# Patient Record
Sex: FEMALE | Race: WHITE
[De-identification: names, ages, dates, MRNs, and addresses within clinical notes are randomized per-mention and may not be internally consistent; named-entity substitution may affect disease eponyms.]

---

## 2022-03-18 PROBLEM — E78.00 ELEVATED LDL CHOLESTEROL LEVEL: Status: ACTIVE | Noted: 2021-10-18

## 2022-03-18 PROBLEM — F41.1 GENERALIZED ANXIETY DISORDER: Status: ACTIVE | Noted: 2021-10-18

## 2022-03-19 PROBLEM — Z13.39 ADHD (ATTENTION DEFICIT HYPERACTIVITY DISORDER) EVALUATION: Status: ACTIVE | Noted: 2021-10-18

## 2022-03-20 PROBLEM — F90.0 ADHD, PREDOMINANTLY INATTENTIVE TYPE: Status: ACTIVE | Noted: 2022-02-14

## 2022-07-11 DIAGNOSIS — F90.0 ADHD, PREDOMINANTLY INATTENTIVE TYPE: Primary | ICD-10-CM

## 2022-07-11 RX ORDER — DEXMETHYLPHENIDATE HYDROCHLORIDE 15 MG/1
15 CAPSULE, EXTENDED RELEASE ORAL DAILY
Qty: 30 CAPSULE | Refills: 0 | Status: SHIPPED | OUTPATIENT
Start: 2022-07-11 | End: 2022-08-10

## 2022-07-11 NOTE — TELEPHONE ENCOUNTER
Patient's mom requesting refill for Dexmethylphenidate ER 15 mg    Controlled Substance Monitoring:    Acute and Chronic Pain Monitoring:   No flowsheet data found.       Last filled 3/18/2022

## 2022-08-31 ENCOUNTER — TELEMEDICINE (OUTPATIENT)
Dept: INTERNAL MEDICINE CLINIC | Facility: CLINIC | Age: 22
End: 2022-08-31
Payer: COMMERCIAL

## 2022-08-31 DIAGNOSIS — F90.0 ADHD, PREDOMINANTLY INATTENTIVE TYPE: Primary | ICD-10-CM

## 2022-08-31 DIAGNOSIS — F41.1 GENERALIZED ANXIETY DISORDER: ICD-10-CM

## 2022-08-31 PROCEDURE — 99214 OFFICE O/P EST MOD 30 MIN: CPT | Performed by: INTERNAL MEDICINE

## 2022-08-31 RX ORDER — DEXMETHYLPHENIDATE HYDROCHLORIDE 15 MG/1
15 CAPSULE, EXTENDED RELEASE ORAL DAILY
Qty: 30 CAPSULE | Refills: 0 | Status: SHIPPED | OUTPATIENT
Start: 2022-09-30 | End: 2022-10-29

## 2022-08-31 RX ORDER — DEXMETHYLPHENIDATE HYDROCHLORIDE 15 MG/1
15 CAPSULE, EXTENDED RELEASE ORAL DAILY
Qty: 30 CAPSULE | Refills: 0 | Status: SHIPPED | OUTPATIENT
Start: 2022-08-31 | End: 2022-09-29

## 2022-08-31 RX ORDER — DEXMETHYLPHENIDATE HYDROCHLORIDE 15 MG/1
15 CAPSULE, EXTENDED RELEASE ORAL DAILY
Qty: 30 CAPSULE | Refills: 0 | Status: SHIPPED | OUTPATIENT
Start: 2022-10-30 | End: 2022-11-28

## 2022-08-31 NOTE — PROGRESS NOTES
ASSESSMENT/PLAN:    1. ADHD, predominantly inattentive type  Treatment regimen is effective. Medication refilled  Follow up in six months with [facility]  Controlled Substance Monitoring:    Acute and Chronic Pain Monitoring:   RX Monitoring 8/31/2022   Periodic Controlled Substance Monitoring No signs of potential drug abuse or diversion identified.         - Dexmethylphenidate HCl ER 15 MG CP24; Take 15 mg by mouth daily for 29 days. Dispense: 30 capsule; Refill: 0  - Dexmethylphenidate HCl ER 15 MG CP24; Take 15 mg by mouth daily for 29 days. Dispense: 30 capsule; Refill: 0  - Dexmethylphenidate HCl ER 15 MG CP24; Take 15 mg by mouth daily for 29 days. Dispense: 30 capsule; Refill: 0    2. Generalized anxiety disorder  Treatment regimen is effective. Continue Zoloft. No refills needed  Only very rare Xanax needed. No refills needed. Evaluation and management of the chronic condition(s) delineated. No negative side effects reported. I have reviewed all the lab results. There are some abnormalities that are either expected or not critical to the patient's health, and are discussed in the office today and are addressed. Please refer to the above assessement and plan narrative and orders and follow up plan. Medication discussed and refilled as needed. Physical exam findings are stable unless otherwise indicated and this is addressed. The most recent lab work and imaging and consultant/urgent care visits and imaging are reviewed and discussed and considered during this visit encounter. On this date 8/31/2022 I have spent 30 minutes reviewing previous notes, test results and face to face with the patient discussing the diagnosis and importance of compliance with the treatment plan as well as documenting on the day of the visit. Jason Kathleen was evaluated through a synchronous (real-time) audio-video encounter.  The patient (or guardian if applicable) is aware that this is a billable service, which includes applicable co-pays. This Virtual Visit was conducted with patient's (and/or legal guardian's) consent. The visit was conducted pursuant to the emergency declaration under the [facility], [address] [place] [address] authority and the [employer] and [employer] plus General Act. Patient identification was verified, and a caregiver was present when appropriate. The patient was located at Home: [address]. Provider was located at [facility] (Appt Dept): [address]  [address],  [address]. An electronic signature was used to authenticate this note. -- Abdoulaye Adorno MD   1. ADHD, predominantly inattentive type  -     Dexmethylphenidate HCl ER 15 MG CP24; Take 15 mg by mouth daily for 29 days. , Disp-30 capsule, R-0Normal  -     Dexmethylphenidate HCl ER 15 MG CP24; Take 15 mg by mouth daily for 29 days. , Disp-30 capsule, R-0Normal  -     Dexmethylphenidate HCl ER 15 MG CP24; Take 15 mg by mouth daily for 29 days. , Disp-30 capsule, R-0Normal  2. Generalized anxiety disorder       Return in about 6 months (around 2023). SUBJECTIVE/OBJECTIVE:    HPI:   Alie Packer (: 2000 is a 25 y.o. female, here for evaluation of the following chief complaint(s):   Chief Complaint   Patient presents with    Follow-up     ADD, RANI, medication refill and re check. She had COVID uri last week. Improving. Some residual fatigue. The patient is a 25 y.o. female who is seen for follow up of ADHD. Current ADHD and related symptoms: inattention, low self-confidence, inability to follow directions, need for frequent task redirection, body dysmorphia (mild). Symptoms are controlled since last visit. Current medication compliance: all of the time. She is tolerating current treatment well and denies none. Dexmethylphenidate ER 15 mg daily has worked well.   She is compliant with Zoloft and has rarely had to take any Alprazolam for anxiety. She is pleased with medication treatment results. She is recovering from [address] [place] [address] Street last week. No severe symptoms. Mostly Flu like symptoms with some residual viral fatigue. She has not had to take Xanax except on rare occasion for anxiety and panic. None recently needed. Using \"[employer]\" [facility] for daily self help monitoring and wellbeing. Reviewed. She has found this to be a helpful daily monitor and barometer of her feelings and self awareness. No negative intrusive self destructive thoughts or SI. She is engaging with friends and partner. Has enjoyable activities- like today- going on outing with a friend to the mall for the afternoon. She works as a  and has a dog at home she loves. Symptoms are stable on current medical regimen. Denies any current SE at this time. The patient is following treatment regimen with good compliance. The patient's available records and electronic chart records are reviewed. The PMH, PSH, medications, allergies, medications, FH, health maintenance and vaccination status are all reviewed and updated as appropriate. Additional concerns / issues considered and discussed in the management of this patient today. .. Prior history. ... Depression managed by Dr. Iza Quintana. She reports this is relatively stable on current treatment. Dr. Iza Quintana is no longer in network. Her concerns today are acute on chronic. Symptoms have been present for years years. She reports these issues have had the following impacting on their life: academic underachievement, school difficulties, trouble at work, troublesome relationships with family and peers, low self-esteem. Family history of ADHD: unknown.      Symptoms of inattention: fails to give close attention to details or makes careless mistakes in school, does not seem to listen when spoken to directly, has difficulty organizing tasks and activities, does not follow through on instructions and fails to finish schoolwork, loses things that are necessary for tasks and activities, is easily distracted by extraneous stimuli, avoids engaging in tasks that require sustained attention, messy, disorganization, has trouble getting to work on time and getting started with her work day. Symptoms of hyperactivity include: fidgets with hands or feet or squirms in seat, talks excessively, . Symptoms of impulsivity: blurts out answers before questions have been completed, feels like she shares too much personal information sometimes. Struggles with family dynamics and her acceptance with her sexuality. Previous ADHD testing has been completed: none or unknown. Current medications include: none. She has tried the following medications in the past: SSRIs, Lexapro, Wellbutrin, currently taking Zoloft. Side effects from medication include: none. Personal neuropsychiatric history is negative for SI. Family history of neuropsychiatric history is positive for anxiety and depression. Patient is here for follow-up and management of chronic medical conditions, review of recent labs, review of any imaging completed since our last office visit and discuss any consultants opinions or management changes. Labs reviewed and discussed and medication refilled as needed for chronic medications during ov or adjusted based on lab results and/or our discussion as appropriate. See discussion. The patient's available records and electronic chart records are reviewed. The PMH, PSH, medications, allergies, medications, FH, health maintenance and vaccination status are all reviewed and updated as appropriate. Records from outside providers have been reviewed, summarized, and considered as noted in the history of present illness, past medical history, and objective data of this note and encounter.           Health Maintenance   Topic Date Due    HIV screen  Never done Chlamydia screen  Never done    Hepatitis C screen  Never done    Pap smear  Never done    COVID-19 Vaccine (3 - Booster for Moderna series) 10/05/2021    Flu vaccine (1) 09/01/2022    Depression Screen  02/14/2023    DTaP/Tdap/Td vaccine (3 - Td or Tdap) 09/07/2030    HPV vaccine  Completed    Varicella vaccine  Completed    Hepatitis A vaccine  Aged Out    Hepatitis B vaccine  Aged Out    Hib vaccine  Aged Out    Meningococcal (ACWY) vaccine  Aged Out    Pneumococcal 0-64 years Vaccine  Aged Out     Patient Active Problem List   Diagnosis    Generalized anxiety disorder    Elevated LDL cholesterol level    ADHD (attention deficit hyperactivity disorder) evaluation    ADHD, predominantly inattentive type       Review of Systems   Constitutional:  Positive for fatigue. Psychiatric/Behavioral:  Negative for dysphoric mood. The patient is not nervous/anxious.       Lab Results   Component Value Date/Time    WBC 9.6 10/11/2021 10:28 AM    HGB 13.8 10/11/2021 10:28 AM    HCT 41.0 10/11/2021 10:28 AM    MCV 89 10/11/2021 10:28 AM    RDW 13.5 10/11/2021 10:28 AM     10/11/2021 10:28 AM    NEUTOPHILPCT 66 10/11/2021 10:28 AM    LYMPHOPCT 28 10/11/2021 10:28 AM    MONOPCT 5 10/11/2021 10:28 AM    EOSRELPCT 1 10/11/2021 10:28 AM    BASOPCT 0 10/11/2021 10:28 AM    LYMPHSABS 2.6 10/11/2021 10:28 AM    MONOSABS 0.5 10/11/2021 10:28 AM    EOSABS 0.1 10/11/2021 10:28 AM    BASOSABS 0.0 10/11/2021 10:28 AM    IMMGRAN 0 10/11/2021 10:28 AM    GRANULOCYTEABSOLUTECOUNT 0.0 10/11/2021 10:28 AM       Lab Results   Component Value Date/Time     10/11/2021 10:28 AM    K 4.3 10/11/2021 10:28 AM     10/11/2021 10:28 AM    CO2 22 10/11/2021 10:28 AM    GLUCOSE 86 10/11/2021 10:28 AM    BUN 13 10/11/2021 10:28 AM    CREATININE 0.70 10/11/2021 10:28 AM    GFRAA 143 10/11/2021 10:28 AM    CALCIUM 9.0 10/11/2021 10:28 AM    BILITOT 0.3 10/11/2021 10:28 AM    ALT 16 10/11/2021 10:28 AM    AST 16 10/11/2021 10:28 AM ALKPHOS 61 10/11/2021 10:28 AM    PROT 7.3 10/11/2021 10:28 AM    LABALBU 4.4 10/11/2021 10:28 AM       Lab Results   Component Value Date/Time    CHOL 255 10/11/2021 10:28 AM    HDL 41 10/11/2021 10:28 AM    TRIG 158 10/11/2021 10:28 AM    LDLCALC 185 10/11/2021 10:28 AM    VLDL 29 10/11/2021 10:28 AM           Lab Results   Component Value Date/Time    TSH 2.680 10/11/2021 10:28 AM    TSH 2.610 10/14/2020 11:28 AM             There were no vitals filed for this visit. Wt Readings from Last 3 Encounters:   02/14/22 214 lb (97.1 kg)   11/18/21 213 lb (96.6 kg)   10/18/21 205 lb (93 kg)     BP Readings from Last 3 Encounters:   02/14/22 124/82   11/18/21 122/76   10/18/21 120/64     Physical Exam  Vitals and nursing note reviewed. Constitutional:       Appearance: Normal appearance. HENT:      Head: Normocephalic and atraumatic. Eyes:      Extraocular Movements: Extraocular movements intact. Pulmonary:      Effort: Pulmonary effort is normal.   Neurological:      General: No focal deficit present. Mental Status: She is alert and oriented to person, place, and time. Psychiatric:         Mood and Affect: Mood normal.         Behavior: Behavior normal.         Thought Content:  Thought content normal.         Judgment: Judgment normal.

## 2023-03-08 DIAGNOSIS — F41.1 GENERALIZED ANXIETY DISORDER: ICD-10-CM

## 2023-03-08 RX ORDER — SERTRALINE HYDROCHLORIDE 100 MG/1
TABLET, FILM COATED ORAL
Qty: 135 TABLET | Refills: 3 | OUTPATIENT
Start: 2023-03-08

## 2023-06-06 ENCOUNTER — PATIENT MESSAGE (OUTPATIENT)
Dept: INTERNAL MEDICINE CLINIC | Facility: CLINIC | Age: 23
End: 2023-06-06

## 2023-06-06 DIAGNOSIS — F41.1 GENERALIZED ANXIETY DISORDER: Primary | ICD-10-CM

## 2023-06-06 RX ORDER — SERTRALINE HYDROCHLORIDE 100 MG/1
TABLET, FILM COATED ORAL
Qty: 135 TABLET | Refills: 3 | OUTPATIENT
Start: 2023-06-06

## 2023-06-06 RX ORDER — SERTRALINE HYDROCHLORIDE 100 MG/1
TABLET, FILM COATED ORAL
Qty: 135 TABLET | Refills: 3 | Status: SHIPPED | OUTPATIENT
Start: 2023-06-06

## 2023-06-06 NOTE — TELEPHONE ENCOUNTER
From: Rayna Pappas  To: Dr. Silver South: 6/6/2023 11:44 AM EDT  Subject: sertraline 100 mg tablet    Hi. i need a refill of this medication. i take 1 1/2 tablets daily. I have tried calling the office multiple times and it just rings and rings. I only have two days left of this medication. Can you call in a refill ASAP? Please send refill to [facility] [address] [facility] [address]. Thanks so much!

## 2024-06-30 DIAGNOSIS — F41.1 GENERALIZED ANXIETY DISORDER: ICD-10-CM

## 2024-07-02 RX ORDER — SERTRALINE HYDROCHLORIDE 100 MG/1
TABLET, FILM COATED ORAL
Qty: 135 TABLET | Refills: 3 | Status: SHIPPED | OUTPATIENT
Start: 2024-07-02

## 2025-01-14 ENCOUNTER — PATIENT MESSAGE (OUTPATIENT)
Dept: INTERNAL MEDICINE CLINIC | Facility: CLINIC | Age: 25
End: 2025-01-14

## 2025-01-14 DIAGNOSIS — F41.1 GENERALIZED ANXIETY DISORDER: ICD-10-CM

## 2025-01-15 RX ORDER — SERTRALINE HYDROCHLORIDE 100 MG/1
TABLET, FILM COATED ORAL
Qty: 135 TABLET | Refills: 3 | Status: SHIPPED | OUTPATIENT
Start: 2025-01-15

## 2025-01-15 NOTE — TELEPHONE ENCOUNTER
David message-  I need my Zoloft to be changed to [facility] with a 90 day supply as soon as possible.